# Patient Record
Sex: MALE | Race: BLACK OR AFRICAN AMERICAN | NOT HISPANIC OR LATINO
[De-identification: names, ages, dates, MRNs, and addresses within clinical notes are randomized per-mention and may not be internally consistent; named-entity substitution may affect disease eponyms.]

---

## 2020-02-25 ENCOUNTER — APPOINTMENT (OUTPATIENT)
Dept: BURN CARE | Facility: CLINIC | Age: 35
End: 2020-02-25
Payer: SELF-PAY

## 2020-02-25 DIAGNOSIS — T23.239A BURN OF SECOND DEGREE OF UNSPECIFIED MULTIPLE FINGERS (NAIL), NOT INCLUDING THUMB, INITIAL ENCOUNTER: ICD-10-CM

## 2020-02-25 PROBLEM — Z00.00 ENCOUNTER FOR PREVENTIVE HEALTH EXAMINATION: Status: ACTIVE | Noted: 2020-02-25

## 2020-02-25 PROCEDURE — 99212 OFFICE O/P EST SF 10 MIN: CPT

## 2020-02-25 NOTE — ASSESSMENT
[Wound Care] : wound care [FreeTextEntry1] : 2nd degree scald burn left fingers\par Healing / healed\par \par Rec: \par Vit A & D ointment  /moisturizer +/- Band aid to site to soften skin

## 2020-02-25 NOTE — HISTORY OF PRESENT ILLNESS
[de-identified] : 2/20/2020 [de-identified] : home [de-identified] : Pt reports that he poured boiling water into a thermos; some of it spilled onto left hand causing burn to long and ring fingers  [de-identified] : Pt replaced skin over wound once it blistered and peeled. Now concerned about stiffness and possible need for further treatment

## 2020-02-25 NOTE — PHYSICAL EXAM
[Size%: ______] : Size: [unfilled]% [Infected?] : Infected: No [0] : 0 out of 10 [Normal] : normal [Small] : small  [de-identified] : vit A & D ointment  [] : no [de-identified] : left hand - stiff dry adherent blistered  skin over dorsum of ring finger \par sl discoloration left long finger \par no tenderness or drainage  [TWNoteComboBox1] : bandaid